# Patient Record
Sex: FEMALE | Race: BLACK OR AFRICAN AMERICAN | Employment: FULL TIME | URBAN - METROPOLITAN AREA
[De-identification: names, ages, dates, MRNs, and addresses within clinical notes are randomized per-mention and may not be internally consistent; named-entity substitution may affect disease eponyms.]

---

## 2017-01-02 ENCOUNTER — APPOINTMENT (OUTPATIENT)
Dept: PHYSICAL THERAPY | Age: 27
End: 2017-01-02
Attending: NEUROLOGICAL SURGERY
Payer: COMMERCIAL

## 2017-01-03 ENCOUNTER — APPOINTMENT (OUTPATIENT)
Dept: PHYSICAL THERAPY | Age: 27
End: 2017-01-03
Attending: NEUROLOGICAL SURGERY
Payer: COMMERCIAL

## 2017-01-06 ENCOUNTER — APPOINTMENT (OUTPATIENT)
Dept: PHYSICAL THERAPY | Age: 27
End: 2017-01-06
Attending: NEUROLOGICAL SURGERY
Payer: COMMERCIAL

## 2017-07-06 ENCOUNTER — TELEPHONE (OUTPATIENT)
Dept: NEUROLOGY | Facility: CLINIC | Age: 27
End: 2017-07-06

## 2017-08-10 ENCOUNTER — TELEPHONE (OUTPATIENT)
Dept: NEUROLOGY | Facility: CLINIC | Age: 27
End: 2017-08-10

## 2017-09-11 ENCOUNTER — APPOINTMENT (OUTPATIENT)
Dept: LAB | Facility: HOSPITAL | Age: 27
End: 2017-09-11
Attending: ADVANCED PRACTICE MIDWIFE
Payer: COMMERCIAL

## 2017-09-11 ENCOUNTER — OFFICE VISIT (OUTPATIENT)
Dept: OBGYN CLINIC | Facility: CLINIC | Age: 27
End: 2017-09-11

## 2017-09-11 VITALS
HEART RATE: 56 BPM | WEIGHT: 219.63 LBS | BODY MASS INDEX: 31 KG/M2 | SYSTOLIC BLOOD PRESSURE: 125 MMHG | DIASTOLIC BLOOD PRESSURE: 78 MMHG

## 2017-09-11 DIAGNOSIS — Z01.419 ENCOUNTER FOR WELL WOMAN EXAM WITH ROUTINE GYNECOLOGICAL EXAM: Primary | ICD-10-CM

## 2017-09-11 DIAGNOSIS — R10.30 LOWER ABDOMINAL PAIN: ICD-10-CM

## 2017-09-11 DIAGNOSIS — Z11.3 ROUTINE SCREENING FOR STI (SEXUALLY TRANSMITTED INFECTION): ICD-10-CM

## 2017-09-11 DIAGNOSIS — E28.2 PCOS (POLYCYSTIC OVARIAN SYNDROME): ICD-10-CM

## 2017-09-11 DIAGNOSIS — N91.2 AMENORRHEA: ICD-10-CM

## 2017-09-11 PROCEDURE — 86803 HEPATITIS C AB TEST: CPT

## 2017-09-11 PROCEDURE — 36415 COLL VENOUS BLD VENIPUNCTURE: CPT

## 2017-09-11 PROCEDURE — 87389 HIV-1 AG W/HIV-1&-2 AB AG IA: CPT

## 2017-09-11 PROCEDURE — 87340 HEPATITIS B SURFACE AG IA: CPT

## 2017-09-11 PROCEDURE — 86780 TREPONEMA PALLIDUM: CPT

## 2017-09-11 PROCEDURE — 81025 URINE PREGNANCY TEST: CPT | Performed by: ADVANCED PRACTICE MIDWIFE

## 2017-09-11 PROCEDURE — 99385 PREV VISIT NEW AGE 18-39: CPT | Performed by: ADVANCED PRACTICE MIDWIFE

## 2017-09-11 RX ORDER — NORGESTIMATE AND ETHINYL ESTRADIOL 0.25-0.035
1 KIT ORAL DAILY
Qty: 1 PACKAGE | Refills: 11 | Status: SHIPPED | OUTPATIENT
Start: 2017-09-11 | End: 2017-11-02

## 2017-09-11 NOTE — PATIENT INSTRUCTIONS
[employer] COM    myoinositol 2 grams BID          ORAL CONTRACEPTIVE PILL INSTRUCTION    The name of my oral contraceptive pill is _______________Sprintec______________________    When do I start my “pills”?   1. Today    What time should I take m pills. Call the office for instructions. When should I use additional contraception? 1. Use a back-up with the first pack of pills  2. Use a back-up if you miss more than one pill in a pack  3.  Use a back-up if you have bleeding during your active pil

## 2017-09-11 NOTE — PROGRESS NOTES
HPI:    Patient ID: Cande Jarrett is a 32year old female. Irregular menses. Difficulty gaining weight. .          Wt Readings from Last 6 Encounters:  09/11/17 : 219 lb 9.6 oz (99.6 kg)  10/19/16 : 212 lb 6 oz (96.3 kg)  10/14/16 : 212 lb 9.6 oz (96. Gastrointestinal: Positive for abdominal pain. Genitourinary: Positive for menstrual problem.    Some pain after urination           Current Outpatient Prescriptions:  MetFORMIN HCl 500 MG Oral Tab Take 1 tablet (500 mg total) by mouth 2 (two) times rico deviated, not enlarged and not tender. Cervix exhibits no motion tenderness, no discharge and no friability. Right adnexum displays no mass, no tenderness and no fullness. Left adnexum displays no mass, no tenderness and no fullness.  No vaginal discharge f (500 mg total) by mouth 2 (two) times daily with meals. Norgestimate-Eth Estradiol (SPRINTEC 28) 0.25-35 MG-MCG Oral Tab 1 Package 11      Sig: Take 1 tablet by mouth daily.            Imaging & Referrals:  None       [facility]#[id number]

## 2017-09-12 ENCOUNTER — PATIENT MESSAGE (OUTPATIENT)
Dept: OBGYN CLINIC | Facility: CLINIC | Age: 27
End: 2017-09-12

## 2017-09-12 DIAGNOSIS — N76.0 BV (BACTERIAL VAGINOSIS): Primary | ICD-10-CM

## 2017-09-12 DIAGNOSIS — B96.89 BV (BACTERIAL VAGINOSIS): Primary | ICD-10-CM

## 2017-09-12 LAB
C TRACH DNA SPEC QL NAA+PROBE: NEGATIVE
HBV SURFACE AG SERPL QL IA: NONREACTIVE
HCV AB SERPL QL IA: NONREACTIVE
HIV1+2 AB SERPL QL IA: NONREACTIVE
N GONORRHOEA DNA SPEC QL NAA+PROBE: NEGATIVE

## 2017-09-12 RX ORDER — METRONIDAZOLE 500 MG/1
500 TABLET ORAL 2 TIMES DAILY
Qty: 14 TABLET | Refills: 0 | Status: SHIPPED | OUTPATIENT
Start: 2017-09-12 | End: 2017-09-13 | Stop reason: CLARIF

## 2017-09-12 NOTE — TELEPHONE ENCOUNTER
From: Becky Wren  To: GISELLA Cordova  Sent: 9/12/2017 4:08 PM CDT  Subject: Test Results Question    Hello,    The test results from my Pap smear mentioned in the Other Findings section, \"Shift in lorena suggestive of bacterial vaginosis\".     So

## 2017-09-13 ENCOUNTER — TELEPHONE (OUTPATIENT)
Dept: OBGYN CLINIC | Facility: CLINIC | Age: 27
End: 2017-09-13

## 2017-09-13 DIAGNOSIS — B96.89 BV (BACTERIAL VAGINOSIS): Primary | ICD-10-CM

## 2017-09-13 DIAGNOSIS — N76.0 BV (BACTERIAL VAGINOSIS): Primary | ICD-10-CM

## 2017-09-13 LAB
GENITAL VAGINOSIS SCREEN: POSITIVE
T PALLIDUM AB SER QL: NEGATIVE
TRICHOMONAS SCREEN: NEGATIVE

## 2017-09-13 RX ORDER — METRONIDAZOLE 500 MG/1
500 TABLET ORAL 2 TIMES DAILY
Qty: 14 TABLET | Refills: 0 | Status: SHIPPED | OUTPATIENT
Start: 2017-09-13 | End: 2017-09-20

## 2017-09-13 NOTE — TELEPHONE ENCOUNTER
Flagyl sent to pt's [facility] pharmacy on [address] in [place] [address]. Pt informed.

## 2017-09-14 NOTE — TELEPHONE ENCOUNTER
----- Message from GISELLA Quevedo sent at 9/12/2017  2:24 PM CDT -----  Negative Chlamydia/GC please notify patient

## 2017-09-14 NOTE — TELEPHONE ENCOUNTER
----- Message from GISELLA Potter sent at 9/12/2017 10:00 AM CDT -----  Has a BV infection needs Flagyl 500 mg BID x 7 days no alcohol for 10 days

## 2017-09-14 NOTE — TELEPHONE ENCOUNTER
----- Message from GISELLA Wilkins sent at 9/13/2017  3:58 PM CDT -----  Vaginal culture negative for yeast

## 2017-09-14 NOTE — TELEPHONE ENCOUNTER
----- Message from GISELLA Velazquez sent at 9/13/2017  3:58 PM CDT -----  All STI blood tests were negative

## 2017-09-20 ENCOUNTER — TELEPHONE (OUTPATIENT)
Dept: OBGYN CLINIC | Facility: CLINIC | Age: 27
End: 2017-09-20

## 2017-09-20 DIAGNOSIS — N76.0 BV (BACTERIAL VAGINOSIS): Primary | ICD-10-CM

## 2017-09-20 DIAGNOSIS — B96.89 BV (BACTERIAL VAGINOSIS): Primary | ICD-10-CM

## 2017-09-20 RX ORDER — METRONIDAZOLE 7.5 MG/G
1 GEL VAGINAL NIGHTLY
Qty: 1 TUBE | Refills: 0 | Status: SHIPPED | OUTPATIENT
Start: 2017-09-20 | End: 2017-09-21

## 2017-09-20 NOTE — TELEPHONE ENCOUNTER
Pt has questions on mediation Flagyl, possibly switching to a different med due to the side effects. Pls adv.

## 2017-09-20 NOTE — TELEPHONE ENCOUNTER
Patient is having bad stomach aches, nausea and bad taste in her mouth, stopped taking her flagyl Monday would like to know if we can sent her vaginal supp instead    New pharmacy [facility] [address] in [facility]

## 2017-09-21 ENCOUNTER — TELEPHONE (OUTPATIENT)
Dept: OBGYN CLINIC | Facility: CLINIC | Age: 27
End: 2017-09-21

## 2017-09-21 DIAGNOSIS — B96.89 BV (BACTERIAL VAGINOSIS): ICD-10-CM

## 2017-09-21 DIAGNOSIS — N76.0 BV (BACTERIAL VAGINOSIS): ICD-10-CM

## 2017-09-21 RX ORDER — METRONIDAZOLE 7.5 MG/G
1 GEL VAGINAL NIGHTLY
Qty: 1 TUBE | Refills: 0 | Status: SHIPPED | OUTPATIENT
Start: 2017-09-21 | End: 2017-09-26

## 2017-09-21 NOTE — TELEPHONE ENCOUNTER
Per the pt the gel prescribed to her is to expensive, and she would like to know if there are any alternatives. Please advise.      Current Outpatient Prescriptions:   •  metRONIDAZOLE (METROGEL-VAGINAL) 0.75 % Vaginal Gel, Place 1 Applicatorful vaginally

## 2017-09-21 NOTE — TELEPHONE ENCOUNTER
Pt said to send script to [facility] its cheaper there  Not coming up in pharmacy contact   [address] [place] [address] phone   [phone]

## 2017-09-21 NOTE — TELEPHONE ENCOUNTER
Could not tolerate flagyl due to side effects and metrogel is expensive, pt calling to see if there is another alternative for the bv

## 2017-09-22 ENCOUNTER — TELEPHONE (OUTPATIENT)
Dept: OBGYN CLINIC | Facility: CLINIC | Age: 27
End: 2017-09-22

## 2017-10-03 ENCOUNTER — TELEPHONE (OUTPATIENT)
Dept: OBGYN CLINIC | Facility: CLINIC | Age: 27
End: 2017-10-03

## 2017-10-31 ENCOUNTER — TELEPHONE (OUTPATIENT)
Dept: OBGYN CLINIC | Facility: CLINIC | Age: 27
End: 2017-10-31

## 2017-10-31 DIAGNOSIS — E28.2 PCOS (POLYCYSTIC OVARIAN SYNDROME): ICD-10-CM

## 2017-10-31 NOTE — TELEPHONE ENCOUNTER
From: Wendy Hdez  Sent: 10/31/2017 7:17 AM CDT  Subject: Medication Renewal Request    Wendy Hdez would like a refill of the following medications:     MetFORMIN HCl 500 MG Oral Tab GISELLA Jiménez]    Preferred pharmacy: [facility]ACY #[id number]

## 2017-11-02 RX ORDER — NORGESTIMATE AND ETHINYL ESTRADIOL 0.25-0.035
1 KIT ORAL DAILY
Qty: 1 PACKAGE | Refills: 11 | Status: SHIPPED | OUTPATIENT
Start: 2017-11-02 | End: 2018-06-29

## 2017-11-02 NOTE — TELEPHONE ENCOUNTER
Pt said wrong medication was ordered and sent to wrong pharmacy .  Please refill birth control and send to pharmacy picked   Current Outpatient Prescriptions:        Norgestimate-Eth Estradiol ([address] [address]) 0.25-35 MG-MCG Oral Tab Take 1 tablet by mouth rico

## 2017-11-03 NOTE — TELEPHONE ENCOUNTER
Order placed for Sprintec 28 and was sent to the pt[facility] pharmacy [facility] [address] [facility]. Pt called and made aware medication was reordered and sent to her pharmacy.

## 2018-06-29 DIAGNOSIS — E28.2 PCOS (POLYCYSTIC OVARIAN SYNDROME): ICD-10-CM

## 2018-07-03 RX ORDER — NORGESTIMATE AND ETHINYL ESTRADIOL 0.25-0.035
1 KIT ORAL DAILY
Qty: 1 PACKAGE | Refills: 11 | Status: SHIPPED
Start: 2018-07-03 | End: 2020-01-13

## 2018-07-03 NOTE — TELEPHONE ENCOUNTER
From: James Meadows  Sent: 6/29/2018 11:25 PM CDT  Subject: Medication Renewal Request    James Meadows would like a refill of the following medications:     MetFORMIN HCl 500 MG Oral Tab GISELLA Mackenzie]     Norgestimate-Eth Estradiol (SPRINTEC 28) 0.25-35 MG-MCG Oral Tab GISELLA Mackenzie]    Preferred pharmacy: [facility]/PHARMACY #[id number] AT [place] [address] Renetta Ceron, [phone], [phone]

## 2018-12-26 ENCOUNTER — TELEPHONE (OUTPATIENT)
Dept: NEUROLOGY | Facility: CLINIC | Age: 28
End: 2018-12-26

## 2020-01-13 ENCOUNTER — OFFICE VISIT (OUTPATIENT)
Dept: OBGYN CLINIC | Facility: CLINIC | Age: 30
End: 2020-01-13

## 2020-01-13 VITALS — SYSTOLIC BLOOD PRESSURE: 112 MMHG | DIASTOLIC BLOOD PRESSURE: 70 MMHG | WEIGHT: 219 LBS | BODY MASS INDEX: 31 KG/M2

## 2020-01-13 DIAGNOSIS — Z01.419 ENCOUNTER FOR WELL WOMAN EXAM WITH ROUTINE GYNECOLOGICAL EXAM: ICD-10-CM

## 2020-01-13 DIAGNOSIS — Z11.3 SCREENING FOR STD (SEXUALLY TRANSMITTED DISEASE): Primary | ICD-10-CM

## 2020-01-13 DIAGNOSIS — E28.2 PCOS (POLYCYSTIC OVARIAN SYNDROME): ICD-10-CM

## 2020-01-13 PROCEDURE — 99395 PREV VISIT EST AGE 18-39: CPT | Performed by: ADVANCED PRACTICE MIDWIFE

## 2020-01-13 RX ORDER — NORGESTIMATE AND ETHINYL ESTRADIOL 0.25-0.035
1 KIT ORAL DAILY
Qty: 3 PACKAGE | Refills: 3 | Status: SHIPPED | OUTPATIENT
Start: 2020-01-13

## 2020-01-13 RX ORDER — METFORMIN HYDROCHLORIDE 500 MG/1
500 TABLET, EXTENDED RELEASE ORAL
Qty: 90 TABLET | Refills: 3 | Status: SHIPPED | OUTPATIENT
Start: 2020-01-13

## 2020-01-13 RX ORDER — MEDROXYPROGESTERONE ACETATE 10 MG/1
10 TABLET ORAL DAILY
Qty: 40 TABLET | Refills: 0 | Status: SHIPPED | OUTPATIENT
Start: 2020-01-13 | End: 2020-02-22

## 2020-01-13 NOTE — PROGRESS NOTES
HPI:    Patient ID: Bebe Connolly is a 30year old female. Has POC. Last period was October. Had been on sprintec and metformin to regulate menses but is not taking at this time.   She has been given a provera by another provider to give a withdrawal Package 3   • gabapentin 100 MG Oral Cap Take 1 capsule (100 mg total) by mouth 3 (three) times daily.  (Patient not taking: Reported on 1/13/2020 ) 90 capsule 1   • CYCLOBENZAPRINE HCL 5 MG Oral Tab TAKE 1 TABLET BY MOUTH TWO TIMES A DAY AS NEEDED for musc and well-nourished. No distress. HENT:   Head: Normocephalic and atraumatic. Right Ear: External ear normal.   Left Ear: External ear normal.   Nose: Nose normal.   Eyes: Pupils are equal, round, and reactive to light.  Conjunctivae and EOM are normal. encounter diagnosis)  Encounter for well woman exam with routine gynecological exam     1. Discussed diet, exercise, safe sex, BCM, pap smear timing, sun screen use, breast care and screening mammogram,   2. Recommend Vitamin D 3 2000 IU daily  3.   Labs:

## 2020-01-14 ENCOUNTER — TELEPHONE (OUTPATIENT)
Dept: OBGYN CLINIC | Facility: CLINIC | Age: 30
End: 2020-01-14

## 2020-01-14 LAB
C TRACH DNA SPEC QL NAA+PROBE: NEGATIVE
HPV I/H RISK 1 DNA SPEC QL NAA+PROBE: NEGATIVE
N GONORRHOEA DNA SPEC QL NAA+PROBE: NEGATIVE

## 2020-01-14 RX ORDER — METRONIDAZOLE 500 MG/1
500 TABLET ORAL 2 TIMES DAILY
Qty: 14 TABLET | Refills: 0 | Status: SHIPPED | OUTPATIENT
Start: 2020-01-14 | End: 2020-01-21

## 2020-01-14 NOTE — TELEPHONE ENCOUNTER
LMTCB    ----- Message from LUIS Roman sent at 1/14/2020 10:59 AM CST -----  Has a BV infection needs Flagyl 500 mg BID x 7 days no alcohol for 10 days

## 2020-01-15 ENCOUNTER — TELEPHONE (OUTPATIENT)
Dept: OBGYN CLINIC | Facility: CLINIC | Age: 30
End: 2020-01-15

## 2020-01-15 LAB
GENITAL VAGINOSIS SCREEN: POSITIVE
TRICHOMONAS SCREEN: NEGATIVE

## 2020-01-15 NOTE — TELEPHONE ENCOUNTER
"[employer] message sent to pt.    ----- Message from LUIS De La Vega sent at 1/15/2020 11:09 AM CST -----  Please notify patient her vaginal cultures are negative

## 2020-01-15 NOTE — TELEPHONE ENCOUNTER
[employer] message sent to pt.      ----- Message from LUIS Parada sent at 1/14/2020  6:32 PM CST -----  Negative Chlamydia/GC please notify patient

## 2020-01-17 ENCOUNTER — TELEPHONE (OUTPATIENT)
Dept: OBGYN CLINIC | Facility: CLINIC | Age: 30
End: 2020-01-17

## 2020-01-17 NOTE — TELEPHONE ENCOUNTER
RN returned call to patient. Pt following up on Flagyl Rx as was not with the rest of her Rx. Chart review shows that Rx was sent to alternated pharmacy ([facility] Pt to  from pharm.

## 2020-01-30 ENCOUNTER — LAB ENCOUNTER (OUTPATIENT)
Dept: LAB | Age: 30
End: 2020-01-30
Attending: FAMILY MEDICINE
Payer: COMMERCIAL

## 2020-01-30 DIAGNOSIS — Z11.3 SCREENING FOR STD (SEXUALLY TRANSMITTED DISEASE): ICD-10-CM

## 2020-01-30 LAB
HBV SURFACE AG SER-ACNC: <0.1 [IU]/L
HBV SURFACE AG SERPL QL IA: NONREACTIVE
HCV AB SERPL QL IA: NONREACTIVE

## 2020-01-30 PROCEDURE — 36415 COLL VENOUS BLD VENIPUNCTURE: CPT

## 2020-01-30 PROCEDURE — 87389 HIV-1 AG W/HIV-1&-2 AB AG IA: CPT

## 2020-01-30 PROCEDURE — 86803 HEPATITIS C AB TEST: CPT

## 2020-01-30 PROCEDURE — 86780 TREPONEMA PALLIDUM: CPT

## 2020-01-30 PROCEDURE — 87340 HEPATITIS B SURFACE AG IA: CPT

## 2020-01-31 ENCOUNTER — TELEPHONE (OUTPATIENT)
Dept: OBGYN CLINIC | Facility: CLINIC | Age: 30
End: 2020-01-31

## 2020-01-31 LAB — T PALLIDUM AB SER QL: NEGATIVE

## 2020-01-31 NOTE — TELEPHONE ENCOUNTER
[employer] message sent to pt.      ----- Message from Lissette Colmenares MD sent at 1/31/2020  1:15 PM CST -----  Patient of Gail Youssef,  STD testing is Negative. Notify patient.
